# Patient Record
Sex: FEMALE | Race: WHITE | NOT HISPANIC OR LATINO | URBAN - METROPOLITAN AREA
[De-identification: names, ages, dates, MRNs, and addresses within clinical notes are randomized per-mention and may not be internally consistent; named-entity substitution may affect disease eponyms.]

---

## 2020-10-21 ENCOUNTER — OFFICE (OUTPATIENT)
Dept: URBAN - METROPOLITAN AREA CLINIC 34 | Facility: CLINIC | Age: 45
End: 2020-10-21

## 2020-10-21 VITALS
DIASTOLIC BLOOD PRESSURE: 85 MMHG | TEMPERATURE: 97.6 F | HEIGHT: 65 IN | WEIGHT: 211 LBS | SYSTOLIC BLOOD PRESSURE: 115 MMHG | HEART RATE: 94 BPM

## 2020-10-21 DIAGNOSIS — Z86.010 PERSONAL HISTORY OF COLONIC POLYPS: ICD-10-CM

## 2020-10-21 DIAGNOSIS — K59.09 OTHER CONSTIPATION: ICD-10-CM

## 2020-10-21 DIAGNOSIS — K92.1 MELENA: ICD-10-CM

## 2020-10-21 DIAGNOSIS — R10.13 EPIGASTRIC PAIN: ICD-10-CM

## 2020-10-21 DIAGNOSIS — K21.9 GASTRO-ESOPHAGEAL REFLUX DISEASE WITHOUT ESOPHAGITIS: ICD-10-CM

## 2020-10-21 DIAGNOSIS — R19.4 CHANGE IN BOWEL HABIT: ICD-10-CM

## 2020-10-21 DIAGNOSIS — R14.0 ABDOMINAL DISTENSION (GASEOUS): ICD-10-CM

## 2020-10-21 DIAGNOSIS — Z80.0 FAMILY HISTORY OF MALIGNANT NEOPLASM OF DIGESTIVE ORGANS: ICD-10-CM

## 2020-10-21 DIAGNOSIS — R11.2 NAUSEA WITH VOMITING, UNSPECIFIED: ICD-10-CM

## 2020-10-21 PROCEDURE — 99204 OFFICE O/P NEW MOD 45 MIN: CPT | Performed by: INTERNAL MEDICINE

## 2020-10-21 PROCEDURE — 00031: CPT | Performed by: INTERNAL MEDICINE

## 2020-10-21 RX ORDER — SUCRALFATE 1 G/10ML
3000 SUSPENSION ORAL
Qty: 900 | Refills: 3 | Status: ACTIVE
Start: 2020-10-21

## 2020-10-26 ENCOUNTER — OFFICE (OUTPATIENT)
Dept: URBAN - METROPOLITAN AREA CLINIC 34 | Facility: CLINIC | Age: 45
End: 2020-10-26
Payer: OTHER GOVERNMENT

## 2020-10-26 DIAGNOSIS — Z11.59 ENCOUNTER FOR SCREENING FOR OTHER VIRAL DISEASES: ICD-10-CM

## 2020-10-26 PROCEDURE — G2023 SPECIMEN COLLECT COVID-19: HCPCS | Performed by: INTERNAL MEDICINE

## 2020-10-29 ENCOUNTER — AMBULATORY SURGICAL CENTER (OUTPATIENT)
Dept: URBAN - METROPOLITAN AREA SURGERY 23 | Facility: SURGERY | Age: 45
End: 2020-10-29
Payer: OTHER GOVERNMENT

## 2020-10-29 DIAGNOSIS — K21.9 GASTRO-ESOPHAGEAL REFLUX DISEASE WITHOUT ESOPHAGITIS: ICD-10-CM

## 2020-10-29 DIAGNOSIS — R10.13 EPIGASTRIC PAIN: ICD-10-CM

## 2020-10-29 DIAGNOSIS — K29.60 OTHER GASTRITIS WITHOUT BLEEDING: ICD-10-CM

## 2020-10-29 DIAGNOSIS — K62.5 HEMORRHAGE OF ANUS AND RECTUM: ICD-10-CM

## 2020-10-29 PROCEDURE — 45378 DIAGNOSTIC COLONOSCOPY: CPT | Performed by: INTERNAL MEDICINE

## 2020-10-29 PROCEDURE — 43239 EGD BIOPSY SINGLE/MULTIPLE: CPT | Performed by: INTERNAL MEDICINE

## 2020-12-02 ENCOUNTER — APPOINTMENT (OUTPATIENT)
Age: 45
Setting detail: DERMATOLOGY
End: 2020-12-03

## 2020-12-02 DIAGNOSIS — L81.0 POSTINFLAMMATORY HYPERPIGMENTATION: ICD-10-CM

## 2020-12-02 DIAGNOSIS — L91.0 HYPERTROPHIC SCAR: ICD-10-CM

## 2020-12-02 PROCEDURE — OTHER PRESCRIPTION SAMPLES PROVIDED: OTHER

## 2020-12-02 PROCEDURE — OTHER COUNSELING: OTHER

## 2020-12-02 PROCEDURE — OTHER PRESCRIPTION: OTHER

## 2020-12-02 PROCEDURE — 99203 OFFICE O/P NEW LOW 30 MIN: CPT

## 2020-12-02 PROCEDURE — OTHER OBSERVATION: OTHER

## 2020-12-02 PROCEDURE — OTHER OBSERVATION AND MEASURE: OTHER

## 2020-12-02 PROCEDURE — OTHER MIPS QUALITY: OTHER

## 2020-12-02 RX ORDER — FLURANDRENOLIDE 4 UG/CM2
TAPE TOPICAL
Qty: 1 | Refills: 3 | Status: ERX | COMMUNITY
Start: 2020-12-02

## 2020-12-02 ASSESSMENT — LOCATION DETAILED DESCRIPTION DERM: LOCATION DETAILED: MIDDLE STERNUM

## 2020-12-02 ASSESSMENT — LOCATION SIMPLE DESCRIPTION DERM: LOCATION SIMPLE: CHEST

## 2020-12-02 ASSESSMENT — LOCATION ZONE DERM: LOCATION ZONE: TRUNK

## 2020-12-02 NOTE — PROCEDURE: MIPS QUALITY
Name And Contact Information For Health Care Proxy: Saeed Thaddeus akbar [phone] Name And Contact Information For Health Care Proxy: Saeed Thaddeus akbar [phone]

## 2021-02-17 ENCOUNTER — APPOINTMENT (OUTPATIENT)
Age: 46
Setting detail: DERMATOLOGY
End: 2021-02-17

## 2021-02-17 DIAGNOSIS — L81.4 OTHER MELANIN HYPERPIGMENTATION: ICD-10-CM

## 2021-02-17 DIAGNOSIS — L91.0 HYPERTROPHIC SCAR: ICD-10-CM

## 2021-02-17 PROCEDURE — OTHER COUNSELING: OTHER

## 2021-02-17 PROCEDURE — 11900 INJECT SKIN LESIONS </W 7: CPT

## 2021-02-17 PROCEDURE — OTHER OBSERVATION AND MEASURE: OTHER

## 2021-02-17 PROCEDURE — OTHER INTRALESIONAL KENALOG: OTHER

## 2021-02-17 PROCEDURE — 99212 OFFICE O/P EST SF 10 MIN: CPT | Mod: 25

## 2021-02-17 PROCEDURE — OTHER MIPS QUALITY: OTHER

## 2021-02-17 PROCEDURE — OTHER OBSERVATION: OTHER

## 2021-02-17 ASSESSMENT — LOCATION DETAILED DESCRIPTION DERM
LOCATION DETAILED: UPPER STERNUM
LOCATION DETAILED: MIDDLE STERNUM

## 2021-02-17 ASSESSMENT — LOCATION SIMPLE DESCRIPTION DERM
LOCATION SIMPLE: CHEST
LOCATION SIMPLE: CHEST

## 2021-02-17 ASSESSMENT — LOCATION ZONE DERM
LOCATION ZONE: TRUNK
LOCATION ZONE: TRUNK

## 2021-02-17 NOTE — PROCEDURE: MIPS QUALITY
Name And Contact Information For Health Care Proxy: Saeed Thaddeus akbar [phone] Name And Contact Information For Health Care Proxy: Saeed Thadedus akbar [phone]

## 2021-03-29 ENCOUNTER — APPOINTMENT (OUTPATIENT)
Age: 46
Setting detail: DERMATOLOGY
End: 2021-03-29

## 2021-03-29 DIAGNOSIS — L91.0 HYPERTROPHIC SCAR: ICD-10-CM

## 2021-03-29 DIAGNOSIS — L81.4 OTHER MELANIN HYPERPIGMENTATION: ICD-10-CM

## 2021-03-29 PROCEDURE — OTHER INTRALESIONAL KENALOG: OTHER

## 2021-03-29 PROCEDURE — OTHER OBSERVATION: OTHER

## 2021-03-29 PROCEDURE — OTHER COUNSELING: OTHER

## 2021-03-29 PROCEDURE — OTHER OBSERVATION AND MEASURE: OTHER

## 2021-03-29 PROCEDURE — 11900 INJECT SKIN LESIONS </W 7: CPT

## 2021-03-29 PROCEDURE — 99212 OFFICE O/P EST SF 10 MIN: CPT | Mod: 25

## 2021-03-29 PROCEDURE — OTHER MIPS QUALITY: OTHER

## 2021-03-29 ASSESSMENT — LOCATION SIMPLE DESCRIPTION DERM
LOCATION SIMPLE: CHEST
LOCATION SIMPLE: CHEST

## 2021-03-29 ASSESSMENT — LOCATION DETAILED DESCRIPTION DERM
LOCATION DETAILED: MIDDLE STERNUM
LOCATION DETAILED: UPPER STERNUM

## 2021-03-29 ASSESSMENT — LOCATION ZONE DERM
LOCATION ZONE: TRUNK
LOCATION ZONE: TRUNK

## 2021-03-29 NOTE — PROCEDURE: MIPS QUALITY
Name And Contact Information For Health Care Proxy: Saeed Thaddeus akbar [phone] Name And Contact Information For Health Care Proxy: Saeed Thaddeus akbar [phone]

## 2021-04-21 ENCOUNTER — APPOINTMENT (OUTPATIENT)
Age: 46
Setting detail: DERMATOLOGY
End: 2021-04-21

## 2021-04-21 DIAGNOSIS — L81.4 OTHER MELANIN HYPERPIGMENTATION: ICD-10-CM

## 2021-04-21 DIAGNOSIS — L91.0 HYPERTROPHIC SCAR: ICD-10-CM

## 2021-04-21 PROCEDURE — OTHER COUNSELING: OTHER

## 2021-04-21 PROCEDURE — OTHER MIPS QUALITY: OTHER

## 2021-04-21 PROCEDURE — OTHER INTRALESIONAL KENALOG: OTHER

## 2021-04-21 PROCEDURE — OTHER ADDITIONAL NOTES: OTHER

## 2021-04-21 PROCEDURE — 99212 OFFICE O/P EST SF 10 MIN: CPT | Mod: 25

## 2021-04-21 PROCEDURE — OTHER OBSERVATION: OTHER

## 2021-04-21 PROCEDURE — 11900 INJECT SKIN LESIONS </W 7: CPT

## 2021-04-21 PROCEDURE — OTHER OBSERVATION AND MEASURE: OTHER

## 2021-04-21 ASSESSMENT — LOCATION SIMPLE DESCRIPTION DERM
LOCATION SIMPLE: CHEST
LOCATION SIMPLE: CHEST

## 2021-04-21 ASSESSMENT — LOCATION ZONE DERM
LOCATION ZONE: TRUNK
LOCATION ZONE: TRUNK

## 2021-04-21 ASSESSMENT — LOCATION DETAILED DESCRIPTION DERM
LOCATION DETAILED: UPPER STERNUM
LOCATION DETAILED: MIDDLE STERNUM

## 2021-04-21 NOTE — PROCEDURE: ADDITIONAL NOTES
Additional Notes: 0.5 kenalog was diluted with 0.5 lidocaine
Render Risk Assessment In Note?: no
Detail Level: Simple

## 2021-04-21 NOTE — PROCEDURE: MIPS QUALITY
Name And Contact Information For Health Care Proxy: Saeed Thaddeus akbar [phone] Name And Contact Information For Health Care Proxy: Saeed Thaddeus akbar [phone]

## 2021-04-21 NOTE — PROCEDURE: INTRALESIONAL KENALOG
Detail Level: Detailed
Administered By (Optional): Ronald Cordero PA-C
Kenalog Preparation: Kenalog
Consent: The risks of atrophy were reviewed with the patient. Treatment of kenalog 10 mg/ml was diluted to half strength a total of 1cc of each medication diluted, she received this in the frontal and temporal region and tolerated well. SRE 2/08/2021
Total Volume Injected (Ccs- Only Use Numbers And Decimals): 1.0
Include Z78.9 (Other Specified Conditions Influencing Health Status) As An Associated Diagnosis?: No
Concentration Of Solution Injected (Mg/Ml): 40.0
X Size Of Lesion In Cm (Optional): 0
Medical Necessity Clause: This procedure was medically necessary because the lesions that were treated were: she was instructed to stop topical steriod clobetasol, and side effects of intralesional steriod kenalog was explained with concerns of skin atrophy, using kenalog 10 mg per ml the medication was diluted at 1.5 cc of kenalog to 1cc of lidocaine and epinephrine, frontal scalp and temple were injected and she tolerated well. SRE 1/25/2021

## 2021-05-12 ENCOUNTER — APPOINTMENT (OUTPATIENT)
Age: 46
Setting detail: DERMATOLOGY
End: 2021-05-12

## 2021-05-12 DIAGNOSIS — L91.0 HYPERTROPHIC SCAR: ICD-10-CM

## 2021-05-12 DIAGNOSIS — Z71.89 OTHER SPECIFIED COUNSELING: ICD-10-CM

## 2021-05-12 PROCEDURE — OTHER INTRALESIONAL KENALOG: OTHER

## 2021-05-12 PROCEDURE — OTHER ADDITIONAL NOTES: OTHER

## 2021-05-12 PROCEDURE — OTHER SUNSCREEN RECOMMENDATIONS: OTHER

## 2021-05-12 PROCEDURE — 11900 INJECT SKIN LESIONS </W 7: CPT

## 2021-05-12 PROCEDURE — OTHER MIPS QUALITY: OTHER

## 2021-05-12 PROCEDURE — 99212 OFFICE O/P EST SF 10 MIN: CPT | Mod: 25

## 2021-05-12 PROCEDURE — OTHER OBSERVATION: OTHER

## 2021-05-12 PROCEDURE — OTHER OBSERVATION AND MEASURE: OTHER

## 2021-05-12 PROCEDURE — OTHER COUNSELING: OTHER

## 2021-05-12 ASSESSMENT — LOCATION SIMPLE DESCRIPTION DERM: LOCATION SIMPLE: CHEST

## 2021-05-12 ASSESSMENT — LOCATION DETAILED DESCRIPTION DERM: LOCATION DETAILED: MIDDLE STERNUM

## 2021-05-12 ASSESSMENT — LOCATION ZONE DERM: LOCATION ZONE: TRUNK

## 2021-05-12 NOTE — PROCEDURE: ADDITIONAL NOTES
Detail Level: Simple
Additional Notes: 0.5 kenalog was diluted with 0.5 lidocaine
Render Risk Assessment In Note?: no

## 2021-05-12 NOTE — PROCEDURE: MIPS QUALITY
Name And Contact Information For Health Care Proxy: Saeed Thaddeus akbar [phone] Name And Contact Information For Health Care Proxy: Saeed Thaddeus akbar [phone]

## 2021-05-12 NOTE — PROCEDURE: INTRALESIONAL KENALOG
Total Volume Injected (Ccs- Only Use Numbers And Decimals): 1.0
Include Z78.9 (Other Specified Conditions Influencing Health Status) As An Associated Diagnosis?: No
Concentration Of Solution Injected (Mg/Ml): 20.0
X Size Of Lesion In Cm (Optional): 0
Medical Necessity Clause: This procedure was medically necessary because the lesions that were treated were: she was instructed to stop topical steriod clobetasol, and side effects of intralesional steriod kenalog was explained with concerns of skin atrophy, using kenalog 10 mg per ml the medication was diluted at 1.5 cc of kenalog to 1cc of lidocaine and epinephrine, frontal scalp and temple were injected and she tolerated well. SRE 1/25/2021
Kenalog Preparation: Kenalog
Treatment Number (Optional): 2
Detail Level: Detailed
Administered By (Optional): Ronald Cordero PA-C
Consent: The risks of atrophy were reviewed with the patient. Treatment of kenalog 10 mg/ml was diluted to half strength a total of 1cc of each medication diluted, she received this in the frontal and temporal region and tolerated well. SRE 2/08/2021

## 2022-05-31 ENCOUNTER — TELEHEALTH PROVIDED OTHER THAN IN PATIENT'S HOME (OUTPATIENT)
Dept: URBAN - METROPOLITAN AREA TELEHEALTH 7 | Facility: TELEHEALTH | Age: 47
End: 2022-05-31
Payer: OTHER GOVERNMENT

## 2022-05-31 VITALS — HEIGHT: 65 IN | WEIGHT: 240 LBS

## 2022-05-31 DIAGNOSIS — R10.13 EPIGASTRIC PAIN: ICD-10-CM

## 2022-05-31 PROCEDURE — 99214 OFFICE O/P EST MOD 30 MIN: CPT | Mod: GT | Performed by: PHYSICIAN ASSISTANT

## 2022-05-31 NOTE — SERVICEHPINOTES
PATIENT VERIFIED BY DATE OF BIRTH AND NAME. Patient has been consented for this telecommunication visit.
yimi geller 47 yo female with long h/o gastritis presents with recent abdominal pains. She went to the ER recently for pain in different locations, including epigastric, right-sided, and periumbilical area. W/u was essentially negative, including labs and CT. She was given GI cocktail which was helpful. She normally has Bentyl and Carafate on hand for PRN use. Was doubling the Bentyl and using Carafate which helped. She uses NSAIDs regularly for hip bursitis and joint pains (reports connective tissue disorder) - was using a lot of ibuprofen - ER told her to stop but gave her a script for meloxicam - hasn't picked it up yet. She has been having epigastric pain in the mornings which improves with eating. Has regurgitation, occasional vomiting. She is on pantoprazole 40 mg daily which is not new. She has had some occasional diarrhea. She was seen by us for similar issues in October 2020 and had EGD with mild gastritis only and colonoscopy with hemorrhoids. 
yimi geller Overall doing better now compared to when she went to the ER on May 9th. She would like to try a little more GI cocktail. No more vomiting since ER visit. yimi geller Reports so-so control of her diabetes with HgbA1C in the 8-9 range. 
yimi geller ROS as above, otherwise negative.yimi